# Patient Record
Sex: FEMALE | Race: WHITE | Employment: UNEMPLOYED | URBAN - METROPOLITAN AREA
[De-identification: names, ages, dates, MRNs, and addresses within clinical notes are randomized per-mention and may not be internally consistent; named-entity substitution may affect disease eponyms.]

---

## 2018-03-02 ENCOUNTER — OFFICE VISIT (OUTPATIENT)
Dept: INTERNAL MEDICINE CLINIC | Age: 28
End: 2018-03-02

## 2018-03-02 VITALS
TEMPERATURE: 98.2 F | HEIGHT: 62 IN | BODY MASS INDEX: 36.99 KG/M2 | OXYGEN SATURATION: 99 % | WEIGHT: 201 LBS | SYSTOLIC BLOOD PRESSURE: 90 MMHG | RESPIRATION RATE: 18 BRPM | DIASTOLIC BLOOD PRESSURE: 54 MMHG | HEART RATE: 51 BPM

## 2018-03-02 DIAGNOSIS — N92.6 IRREGULAR BLEEDING: ICD-10-CM

## 2018-03-02 DIAGNOSIS — R63.5 WEIGHT GAIN: ICD-10-CM

## 2018-03-02 DIAGNOSIS — Z00.00 ROUTINE GENERAL MEDICAL EXAMINATION AT A HEALTH CARE FACILITY: Primary | ICD-10-CM

## 2018-03-02 DIAGNOSIS — Z83.3 FAMILY HISTORY OF DIABETES MELLITUS: ICD-10-CM

## 2018-03-02 DIAGNOSIS — Z86.2 H/O: IRON DEFICIENCY ANEMIA: ICD-10-CM

## 2018-03-02 DIAGNOSIS — N92.6 MISSED MENSES: ICD-10-CM

## 2018-03-02 PROBLEM — T78.3XXA IDIOPATHIC ANGIOEDEMA: Status: ACTIVE | Noted: 2018-03-02

## 2018-03-02 PROBLEM — L50.1 CHRONIC IDIOPATHIC URTICARIA: Status: ACTIVE | Noted: 2018-03-02

## 2018-03-02 LAB
BILIRUB UR QL STRIP: NEGATIVE
GLUCOSE UR-MCNC: NEGATIVE MG/DL
KETONES P FAST UR STRIP-MCNC: NEGATIVE MG/DL
PH UR STRIP: 6 [PH] (ref 4.6–8)
PROT UR QL STRIP: NEGATIVE
SP GR UR STRIP: 1.01 (ref 1–1.03)
UA UROBILINOGEN AMB POC: NORMAL (ref 0.2–1)
URINALYSIS CLARITY POC: CLEAR
URINALYSIS COLOR POC: YELLOW
URINE BLOOD POC: NEGATIVE
URINE LEUKOCYTES POC: NEGATIVE
URINE NITRITES POC: NEGATIVE

## 2018-03-02 NOTE — PATIENT INSTRUCTIONS

## 2018-03-02 NOTE — PROGRESS NOTES
HISTORY OF PRESENT ILLNESS  Dk Rudolph is a 28 y.o. female. [facility] Jeimy is here for a routine physical exam.   She has h/o chronic idiopathic uritcaria and angioedema. She had been treated with xolair, but has not gone for an injection in a few months. She has an upcoming appt with the allergist. She has had minimal hives without the xolair. She has had a weight gain of about 40 lbs since her last child. She has a 11year old and 1year old. She works as an  at [employer]. She is c/o irregular cycles for the past few months. She states one cycle came early, and this month she is 9 days late. She uses condoms for protection. Her ob no longer takes her insurance. She denies shortness of breath, chest pain or leg swelling. Review of Systems   Constitutional: Negative. HENT: Negative. Eyes: Negative. Wears glasses   Respiratory: Negative. Cardiovascular: Negative. Gastrointestinal: Negative. Genitourinary: Negative. Musculoskeletal: Negative. Neurological: Negative. Psychiatric/Behavioral: Negative. Physical Exam   Constitutional: She is oriented to person, place, and time. She appears well-developed and well-nourished. No distress. HENT:   Head: Normocephalic and atraumatic. Right Ear: Tympanic membrane normal.   Left Ear: Tympanic membrane normal.   Mouth/Throat: No oropharyngeal exudate or posterior oropharyngeal erythema. Eyes: Conjunctivae are normal.   Cardiovascular: Intact distal pulses. An irregular rhythm present. Bradycardia present. No murmur heard. She states she has been told that her BP is low. She can't recall being told that her pulse is low. She denies any sx of dizziness, syncope, palpitations or shortness of breath. Pulmonary/Chest: Effort normal and breath sounds normal. She has no wheezes. Musculoskeletal: She exhibits no edema. Neurological: She is alert and oriented to person, place, and time. Skin: No rash noted. Psychiatric: She has a normal mood and affect. Her behavior is normal. Judgment and thought content normal.     Visit Vitals    BP 90/54 (BP 1 Location: Left arm, BP Patient Position: Sitting)    Pulse (!) 51    Temp 98.2 °F (36.8 °C) (Oral)    Resp 18    Ht 5' 2\" (1.575 m)    Wt 201 lb (91.2 kg)    SpO2 99%    BMI 36.76 kg/m2     Results for orders placed or performed in visit on 03/02/18   AMB POC URINALYSIS DIP STICK AUTO W/O MICRO   Result Value Ref Range    Color (UA POC) Yellow     Clarity (UA POC) Clear     Glucose (UA POC) Negative Negative    Bilirubin (UA POC) Negative Negative    Ketones (UA POC) Negative Negative    Specific gravity (UA POC) 1.010 1.001 - 1.035    Blood (UA POC) Negative Negative    pH (UA POC) 6.0 4.6 - 8.0    Protein (UA POC) Negative Negative    Urobilinogen (UA POC) 0.2 mg/dL 0.2 - 1    Nitrites (UA POC) Negative Negative    Leukocyte esterase (UA POC) Negative Negative     Advised her I will refer her to gyn for her irregular cycles. ASSESSMENT and PLAN    ICD-10-CM ICD-9-CM    1. Routine general medical examination at a health care facility Z00.00 V70.0 COLLECTION VENOUS BLOOD,VENIPUNCTURE      METABOLIC PANEL, COMPREHENSIVE      LIPID PANEL      CBC WITH AUTOMATED DIFF      TSH 3RD GENERATION      AMB POC EKG ROUTINE W/ 12 LEADS, INTER & REP      AMB POC URINALYSIS DIP STICK AUTO W/O MICRO   2. Missed menses N92.6 626.4 BETA HCG, QT   3. Irregular bleeding N92.6 626.4 REFERRAL TO OBSTETRICS AND GYNECOLOGY   4. Weight gain R63.5 783.1 HEMOGLOBIN A1C WITH EAG   5. Family history of diabetes mellitus Z83.3 V18.0 HEMOGLOBIN A1C WITH EAG   6. H/O: iron deficiency anemia Z86.2 V12.3 IRON PROFILE     Pt verbalized understanding of their condition and diagnoses, treatment plan,  as well as side effects of any new medications prescribed.

## 2018-03-02 NOTE — MR AVS SNAPSHOT
[address] [facility] [address] [place] [address] 
[phone] Patient: Marie Ames MRN: [id number] :1990 Visit Information Date & Time Provider Department Dept. Phone Encounter #  
 3/2/2018  9:15 AM Krishan Mahan PA-C [facility] [phone] [id number] Follow-up Instructions Return in about 1 year (around 3/2/2019). Upcoming Health Maintenance Date Due  
 PAP AKA CERVICAL CYTOLOGY 2019 DTaP/Tdap/Td series (2 - Td) 2024 Allergies as of 3/2/2018  Review Complete On: 3/2/2018 By: Krishan Mahan PA-C No Known Allergies Current Immunizations  Reviewed on 2014 Name Date Influenza Vaccine PF 2014  5:49 PM  
 Tdap 2014 11:28 AM  
  
 Not reviewed this visit You Were Diagnosed With   
  
 Codes Comments Routine general medical examination at a health care facility    -  Primary ICD-10-CM: Z00.00 ICD-9-CM: V70.0 Missed menses     ICD-10-CM: N92.6 ICD-9-CM: 626.4 Irregular bleeding     ICD-10-CM: N92.6 ICD-9-CM: 626.4 Weight gain     ICD-10-CM: R63.5 ICD-9-CM: 783.1 Family history of diabetes mellitus     ICD-10-CM: Z83.3 ICD-9-CM: V18.0 Vitals BP Pulse Temp Resp Height(growth percentile) Weight(growth percentile) 90/54 (BP 1 Location: Left arm, BP Patient Position: Sitting) (!) 51 98.2 °F (36.8 °C) (Oral) 18 5' 2\" (1.575 m) 201 lb (91.2 kg) LMP SpO2 BMI OB Status Smoking Status 2018 99% 36.76 kg/m2 Having regular periods Never Smoker Vitals History BMI and BSA Data Body Mass Index Body Surface Area  
 36.76 kg/m 2 2 m 2 Your Updated Medication List  
  
Notice  As of 3/2/2018 10:11 AM  
 You have not been prescribed any medications. We Performed the Following AMB POC EKG ROUTINE W/ 12 LEADS, INTER & REP [46496 CPT(R)] AMB POC URINALYSIS DIP STICK AUTO W/O MICRO [42469 CPT(R)] COLLECTION VENOUS BLOOD,VENIPUNCTURE Y038145 CPT(R)] REFERRAL TO OBSTETRICS AND GYNECOLOGY [REF51 Custom] Follow-up Instructions Return in about 1 year (around 3/2/2019). Referral Information Referral ID Referred By Referred To  
  
 [id number] Tang Singleton Justine Fuentes, [facility] [address] [facility] [address] Phone: [phone] Fax: [phone] Visits Status Start Date End Date 1 New Request 3/2/18 3/2/19 If your referral has a status of pending review or denied, additional information will be sent to support the outcome of this decision. Introducing [facility] SERVICES! Beatriz Villegas introduces [employer] patient portal. Now you can access parts of your medical record, email your doctor's office, and request medication refills online. 1. In your internet browser, go to [email or web address] 2. Click on the First Time User? Click Here link in the Sign In box. You will see the New Member Sign Up page. 3. Enter your [employer] Access Code exactly as it appears below. You will not need to use this code after youve completed the sign-up process. If you do not sign up before the expiration date, you must request a new code. · [employer] Access Code: [email or web address] Expires: 5/31/2018 10:11 AM 
 
4. Enter the last four digits of your Social Security Number (xxxx) and Date of Birth (mm/dd/yyyy) as indicated and click Submit. You will be taken to the next sign-up page. 5. Create a [employer] ID. This will be your [employer] login ID and cannot be changed, so think of one that is secure and easy to remember. 6. Create a [employer] password. You can change your password at any time. 7. Enter your Password Reset Question and Answer. This can be used at a later time if you forget your password. 8. Enter your e-mail address. You will receive e-mail notification when new information is available in [address] 9. Click Sign Up. You can now view and download portions of your medical record. 10. Click the Download Summary menu link to download a portable copy of your medical information. If you have questions, please visit the Frequently Asked Questions section of the [employer] website. Remember, [employer] is NOT to be used for urgent needs. For medical emergencies, dial 911. Now available from your iPhone and Android! Please provide this summary of care documentation to your next provider. Your primary care clinician is listed as NONE. If you have any questions after today's visit, please call [phone].

## 2018-03-02 NOTE — PROGRESS NOTES
Chief Complaint   Patient presents with    Complete Physical     1. Have you been to the ER, urgent care clinic since your last visit? Hospitalized since your last visit? No    2. Have you seen or consulted any other health care providers outside of the [address] [place] [address] since your last visit? Include any pap smears or colon screening.  No

## 2018-03-03 LAB
ALBUMIN SERPL-MCNC: 4.5 G/DL (ref 3.5–5.5)
ALBUMIN/GLOB SERPL: 1.7 {RATIO} (ref 1.2–2.2)
ALP SERPL-CCNC: 48 IU/L (ref 39–117)
ALT SERPL-CCNC: 34 IU/L (ref 0–32)
AST SERPL-CCNC: 21 IU/L (ref 0–40)
BASOPHILS # BLD AUTO: 0 X10E3/UL (ref 0–0.2)
BASOPHILS NFR BLD AUTO: 0 %
BILIRUB SERPL-MCNC: 0.5 MG/DL (ref 0–1.2)
BUN SERPL-MCNC: 8 MG/DL (ref 6–20)
BUN/CREAT SERPL: 10 (ref 9–23)
CALCIUM SERPL-MCNC: 9.3 MG/DL (ref 8.7–10.2)
CHLORIDE SERPL-SCNC: 100 MMOL/L (ref 96–106)
CHOLEST SERPL-MCNC: 172 MG/DL (ref 100–199)
CO2 SERPL-SCNC: 25 MMOL/L (ref 18–29)
CREAT SERPL-MCNC: 0.79 MG/DL (ref 0.57–1)
EOSINOPHIL # BLD AUTO: 0.1 X10E3/UL (ref 0–0.4)
EOSINOPHIL NFR BLD AUTO: 2 %
ERYTHROCYTE [DISTWIDTH] IN BLOOD BY AUTOMATED COUNT: 14 % (ref 12.3–15.4)
EST. AVERAGE GLUCOSE BLD GHB EST-MCNC: 108 MG/DL
GFR SERPLBLD CREATININE-BSD FMLA CKD-EPI: 102 ML/MIN/1.73
GFR SERPLBLD CREATININE-BSD FMLA CKD-EPI: 118 ML/MIN/1.73
GLOBULIN SER CALC-MCNC: 2.7 G/DL (ref 1.5–4.5)
GLUCOSE SERPL-MCNC: 85 MG/DL (ref 65–99)
HBA1C MFR BLD: 5.4 % (ref 4.8–5.6)
HCG INTACT+B SERPL-ACNC: <1 MIU/ML
HCT VFR BLD AUTO: 39.3 % (ref 34–46.6)
HDLC SERPL-MCNC: 51 MG/DL
HGB BLD-MCNC: 13 G/DL (ref 11.1–15.9)
IMM GRANULOCYTES # BLD: 0 X10E3/UL (ref 0–0.1)
IMM GRANULOCYTES NFR BLD: 0 %
LDLC SERPL CALC-MCNC: 101 MG/DL (ref 0–99)
LYMPHOCYTES # BLD AUTO: 2.5 X10E3/UL (ref 0.7–3.1)
LYMPHOCYTES NFR BLD AUTO: 41 %
MCH RBC QN AUTO: 30.8 PG (ref 26.6–33)
MCHC RBC AUTO-ENTMCNC: 33.1 G/DL (ref 31.5–35.7)
MCV RBC AUTO: 93 FL (ref 79–97)
MONOCYTES # BLD AUTO: 0.3 X10E3/UL (ref 0.1–0.9)
MONOCYTES NFR BLD AUTO: 5 %
NEUTROPHILS # BLD AUTO: 3.1 X10E3/UL (ref 1.4–7)
NEUTROPHILS NFR BLD AUTO: 52 %
PLATELET # BLD AUTO: 380 X10E3/UL (ref 150–379)
POTASSIUM SERPL-SCNC: 4.7 MMOL/L (ref 3.5–5.2)
PROT SERPL-MCNC: 7.2 G/DL (ref 6–8.5)
RBC # BLD AUTO: 4.22 X10E6/UL (ref 3.77–5.28)
SODIUM SERPL-SCNC: 140 MMOL/L (ref 134–144)
TRIGL SERPL-MCNC: 101 MG/DL (ref 0–149)
TSH SERPL DL<=0.005 MIU/L-ACNC: 1.59 UIU/ML (ref 0.45–4.5)
VLDLC SERPL CALC-MCNC: 20 MG/DL (ref 5–40)
WBC # BLD AUTO: 6.1 X10E3/UL (ref 3.4–10.8)

## 2019-03-07 ENCOUNTER — OFFICE VISIT (OUTPATIENT)
Dept: INTERNAL MEDICINE CLINIC | Age: 29
End: 2019-03-07

## 2019-03-07 VITALS
BODY MASS INDEX: 36.62 KG/M2 | OXYGEN SATURATION: 100 % | SYSTOLIC BLOOD PRESSURE: 114 MMHG | HEART RATE: 79 BPM | HEIGHT: 62 IN | WEIGHT: 199 LBS | DIASTOLIC BLOOD PRESSURE: 80 MMHG | RESPIRATION RATE: 18 BRPM | TEMPERATURE: 98.4 F

## 2019-03-07 DIAGNOSIS — J02.9 SORE THROAT: Primary | ICD-10-CM

## 2019-03-07 DIAGNOSIS — M54.2 NECK PAIN: ICD-10-CM

## 2019-03-07 PROBLEM — E66.01 SEVERE OBESITY (HCC): Status: ACTIVE | Noted: 2019-03-07

## 2019-03-07 LAB
S PYO AG THROAT QL: NEGATIVE
VALID INTERNAL CONTROL?: YES

## 2019-03-07 RX ORDER — OMALIZUMAB 202.5 MG/1.4ML
INJECTION, SOLUTION SUBCUTANEOUS
COMMUNITY
Start: 2018-12-04

## 2019-03-07 RX ORDER — BUPROPION HYDROCHLORIDE 150 MG/1
TABLET ORAL
COMMUNITY
Start: 2019-02-19

## 2019-03-07 NOTE — PROGRESS NOTES
HISTORY OF PRESENT ILLNESS  Annie Martel is a 33 y.o. female. HPI Ms. Leone Leyden is here for several days of a sore throat. She feels that the throat pain is radiating to both ears. She is still under the care of allergy for her idiopathic urticaria and angioedema. She denies any throat swelling or difficulty swallowing. She actually states the pain improves as the day progresses. She has taken sudafed which she feels helped her sx. She denies GERD. She does have some congestion and post nasal drainage due to allergies. Review of Systems   Constitutional: Negative for chills, fever and malaise/fatigue. HENT: Positive for congestion (post nasal drainage), ear pain and sore throat. Eyes: Negative. Respiratory: Negative for cough, shortness of breath and wheezing. Cardiovascular: Negative. Gastrointestinal: Positive for nausea (attributes to starting wellbutrin 2 weeks ago, prescribed by obgyn). Musculoskeletal: Positive for neck pain. Neurological: Negative for headaches. Psychiatric/Behavioral: Positive for depression (associated with her cycle, pms phase). Physical Exam   Constitutional: She appears well-developed and well-nourished. No distress. HENT:   Head: Normocephalic and atraumatic. Mouth/Throat: No posterior oropharyngeal edema or posterior oropharyngeal erythema (no significant erythema). Neck: Normal range of motion. Neck supple. Cardiovascular: Normal rate and regular rhythm. No murmur heard. Pulmonary/Chest: Effort normal and breath sounds normal. She has no wheezes. Lymphadenopathy:     She has no cervical adenopathy.      Visit Vitals  /80 (BP 1 Location: Left arm, BP Patient Position: Sitting)   Pulse 79   Temp 98.4 °F (36.9 °C) (Oral)   Resp 18   Ht 5' 2\" (1.575 m)   Wt 199 lb (90.3 kg)   SpO2 100%   BMI 36.40 kg/m²     Wt Readings from Last 3 Encounters:   03/07/19 199 lb (90.3 kg)   03/02/18 201 lb (91.2 kg)   09/20/14 185 lb 12.8 oz (84.3 kg) Results for orders placed or performed in visit on 03/07/19   AMB POC RAPID STREP A   Result Value Ref Range    VALID INTERNAL CONTROL POC Yes     Group A Strep Ag Negative Negative       ASSESSMENT and PLAN    ICD-10-CM ICD-9-CM    1. Sore throat J02.9 462 AMB POC RAPID STREP A  Advised salt water gargles, allegra or claritin to help reduce rhinorrhea and post nasal drainage. Can continue sudafed if needed. 2. Neck pain - likely musculoskeletal M54.2 723.1 Advised neck exercises, OTC NSAID or tylenol. Pt verbalized understanding of their condition and diagnoses, treatment plan,  as well as side effects of any new medications prescribed.

## 2019-03-07 NOTE — PROGRESS NOTES
Chief Complaint   Patient presents with    Sore Throat    Neck Pain     1. Have you been to the ER, urgent care clinic since your last visit? Hospitalized since your last visit? No    2. Have you seen or consulted any other health care providers outside of the [address] [place] [address] since your last visit? Include any pap smears or colon screening.  No

## 2019-03-08 NOTE — PATIENT INSTRUCTIONS
Neck Pain: Care Instructions  Your Care Instructions    You can have neck pain anywhere from the bottom of your head to the top of your shoulders. It can spread to the upper back or arms. Injuries, painting a ceiling, sleeping with your neck twisted, staying in one position for too long, and many other activities can cause neck pain. Most neck pain gets better with home care. Your doctor may recommend medicine to relieve pain or relax your muscles. He or she may suggest exercise and physical therapy to increase flexibility and relieve stress. You may need to wear a special (cervical) collar to support your neck for a day or two. Follow-up care is a key part of your treatment and safety. Be sure to make and go to all appointments, and call your doctor if you are having problems. It's also a good idea to know your test results and keep a list of the medicines you take. How can you care for yourself at home? · Try using a heating pad on a low or medium setting for 15 to 20 minutes every 2 or 3 hours. Try a warm shower in place of one session with the heating pad. · You can also try an ice pack for 10 to 15 minutes every 2 to 3 hours. Put a thin cloth between the ice and your skin. · Take pain medicines exactly as directed. ? If the doctor gave you a prescription medicine for pain, take it as prescribed. ? If you are not taking a prescription pain medicine, ask your doctor if you can take an over-the-counter medicine. · If your doctor recommends a cervical collar, wear it exactly as directed. When should you call for help? Call your doctor now or seek immediate medical care if:    · You have new or worsening numbness in your arms, buttocks or legs.     · You have new or worsening weakness in your arms or legs.  (This could make it hard to stand up.)     · You lose control of your bladder or bowels.    Watch closely for changes in your health, and be sure to contact your doctor if:    · Your neck pain is getting worse.     · You are not getting better after 1 week.     · You do not get better as expected. Where can you learn more? Go to http://myron-jeff.info/. Enter [id number] in the search box to learn more about \"Neck Pain: Care Instructions. \"  Current as of: September 20, 2018  Content Version: 11.9  © 7906-6959 [employer]. Care instructions adapted under license by [employer] (which disclaims liability or warranty for this information). If you have questions about a medical condition or this instruction, always ask your healthcare professional. [place] [address] any warranty or liability for your use of this information.

## 2019-07-05 ENCOUNTER — HOSPITAL ENCOUNTER (OUTPATIENT)
Dept: LAB | Age: 29
Discharge: HOME | End: 2019-07-05
Payer: COMMERCIAL

## 2019-07-05 ENCOUNTER — OFFICE VISIT (OUTPATIENT)
Dept: INTERNAL MEDICINE CLINIC | Age: 29
End: 2019-07-05

## 2019-07-05 VITALS
DIASTOLIC BLOOD PRESSURE: 80 MMHG | OXYGEN SATURATION: 98 % | TEMPERATURE: 97.5 F | HEIGHT: 62 IN | RESPIRATION RATE: 16 BRPM | SYSTOLIC BLOOD PRESSURE: 112 MMHG | HEART RATE: 54 BPM | BODY MASS INDEX: 36.25 KG/M2 | WEIGHT: 197 LBS

## 2019-07-05 DIAGNOSIS — Z00.00 ROUTINE PHYSICAL EXAMINATION: Primary | ICD-10-CM

## 2019-07-05 DIAGNOSIS — E66.9 OBESITY (BMI 35.0-39.9 WITHOUT COMORBIDITY): ICD-10-CM

## 2019-07-05 DIAGNOSIS — Z00.00 ROUTINE PHYSICAL EXAMINATION: ICD-10-CM

## 2019-07-05 DIAGNOSIS — F32.A DEPRESSION, UNSPECIFIED DEPRESSION TYPE: ICD-10-CM

## 2019-07-05 LAB
ALBUMIN SERPL-MCNC: 4 G/DL (ref 3.4–5)
ALBUMIN/GLOB SERPL: 1.2 {RATIO} (ref 0.8–1.7)
ALP SERPL-CCNC: 54 U/L (ref 45–117)
ALT SERPL-CCNC: 28 U/L (ref 13–56)
ANION GAP SERPL CALC-SCNC: 7 MMOL/L (ref 3–18)
AST SERPL-CCNC: 13 U/L (ref 15–37)
BASOPHILS # BLD: 0 K/UL (ref 0–0.1)
BASOPHILS NFR BLD: 0 % (ref 0–2)
BILIRUB SERPL-MCNC: 0.4 MG/DL (ref 0.2–1)
BUN SERPL-MCNC: 8 MG/DL (ref 7–18)
BUN/CREAT SERPL: 8 (ref 12–20)
CALCIUM SERPL-MCNC: 8.8 MG/DL (ref 8.5–10.1)
CHLORIDE SERPL-SCNC: 104 MMOL/L (ref 100–108)
CHOLEST SERPL-MCNC: 199 MG/DL
CO2 SERPL-SCNC: 28 MMOL/L (ref 21–32)
CREAT SERPL-MCNC: 0.98 MG/DL (ref 0.6–1.3)
DIFFERENTIAL METHOD BLD: NORMAL
EOSINOPHIL # BLD: 0.2 K/UL (ref 0–0.4)
EOSINOPHIL NFR BLD: 2 % (ref 0–5)
ERYTHROCYTE [DISTWIDTH] IN BLOOD BY AUTOMATED COUNT: 13.3 % (ref 11.6–14.5)
GLOBULIN SER CALC-MCNC: 3.3 G/DL (ref 2–4)
GLUCOSE SERPL-MCNC: 84 MG/DL (ref 74–99)
HBA1C MFR BLD: 5.4 % (ref 4.2–5.6)
HCT VFR BLD AUTO: 40.4 % (ref 35–45)
HDLC SERPL-MCNC: 59 MG/DL (ref 40–60)
HDLC SERPL: 3.4 {RATIO} (ref 0–5)
HGB BLD-MCNC: 12.9 G/DL (ref 12–16)
LDLC SERPL CALC-MCNC: 117 MG/DL (ref 0–100)
LIPID PROFILE,FLP: ABNORMAL
LYMPHOCYTES # BLD: 2.2 K/UL (ref 0.9–3.6)
LYMPHOCYTES NFR BLD: 31 % (ref 21–52)
MCH RBC QN AUTO: 30.1 PG (ref 24–34)
MCHC RBC AUTO-ENTMCNC: 31.9 G/DL (ref 31–37)
MCV RBC AUTO: 94.4 FL (ref 74–97)
MONOCYTES # BLD: 0.4 K/UL (ref 0.05–1.2)
MONOCYTES NFR BLD: 6 % (ref 3–10)
NEUTS SEG # BLD: 4.2 K/UL (ref 1.8–8)
NEUTS SEG NFR BLD: 61 % (ref 40–73)
PLATELET # BLD AUTO: 352 K/UL (ref 135–420)
PMV BLD AUTO: 9.5 FL (ref 9.2–11.8)
POTASSIUM SERPL-SCNC: 3.8 MMOL/L (ref 3.5–5.5)
PROT SERPL-MCNC: 7.3 G/DL (ref 6.4–8.2)
RBC # BLD AUTO: 4.28 M/UL (ref 4.2–5.3)
SODIUM SERPL-SCNC: 139 MMOL/L (ref 136–145)
TRIGL SERPL-MCNC: 115 MG/DL (ref ?–150)
TSH SERPL DL<=0.05 MIU/L-ACNC: 1.06 UIU/ML (ref 0.36–3.74)
VLDLC SERPL CALC-MCNC: 23 MG/DL
WBC # BLD AUTO: 7 K/UL (ref 4.6–13.2)

## 2019-07-05 PROCEDURE — 36415 COLL VENOUS BLD VENIPUNCTURE: CPT

## 2019-07-05 PROCEDURE — 80053 COMPREHEN METABOLIC PANEL: CPT

## 2019-07-05 PROCEDURE — 85025 COMPLETE CBC W/AUTO DIFF WBC: CPT

## 2019-07-05 PROCEDURE — 83036 HEMOGLOBIN GLYCOSYLATED A1C: CPT

## 2019-07-05 PROCEDURE — 80061 LIPID PANEL: CPT

## 2019-07-05 PROCEDURE — 84443 ASSAY THYROID STIM HORMONE: CPT

## 2019-07-05 NOTE — PROGRESS NOTES
Chief Complaint   Patient presents with    Physical     Fasting for labwork. 1. Have you been to the ER, urgent care clinic since your last visit? Hospitalized since your last visit? No    2. Have you seen or consulted any other health care providers outside of the [address] [place] [address] since your last visit? Include any pap smears or colon screening. Yes Where:  [facility]  3 most recent PHQ Screens 7/5/2019   PHQ Not Done Active Diagnosis of Depression or Bipolar Disorder   Little interest or pleasure in doing things Not at all   Feeling down, depressed, irritable, or hopeless Nearly every day   Total Score PHQ 2 3   Trouble falling or staying asleep, or sleeping too much Not at all   Feeling tired or having little energy Several days   Poor appetite, weight loss, or overeating Several days   Feeling bad about yourself - or that you are a failure or have let yourself or your family down Not at all   Trouble concentrating on things such as school, work, reading, or watching TV Several days   Moving or speaking so slowly that other people could have noticed; or the opposite being so fidgety that others notice Not at all   Thoughts of being better off dead, or hurting yourself in some way Not at all   PHQ 9 Score 6   How difficult have these problems made it for you to do your work, take care of your home and get along with others Not difficult at all

## 2019-07-05 NOTE — PROGRESS NOTES
HISTORY OF PRESENT ILLNESS  Alon Villa is a 33 y.o. female. HPI Ms. Teo Estrada is here for a general physical exam.   3 most recent PHQ Screens 7/5/2019   PHQ Not Done Active Diagnosis of Depression or Bipolar Disorder   Little interest or pleasure in doing things Not at all   Feeling down, depressed, irritable, or hopeless Nearly every day   Total Score PHQ 2 3   Trouble falling or staying asleep, or sleeping too much Not at all   Feeling tired or having little energy Several days   Poor appetite, weight loss, or overeating Several days   Feeling bad about yourself - or that you are a failure or have let yourself or your family down Not at all   Trouble concentrating on things such as school, work, reading, or watching TV Several days   Moving or speaking so slowly that other people could have noticed; or the opposite being so fidgety that others notice Not at all   Thoughts of being better off dead, or hurting yourself in some way Not at all   PHQ 9 Score 6   How difficult have these problems made it for you to do your work, take care of your home and get along with others Not difficult at all     She is on Wellbutrin XL 300mg from her gynecologist. She had stopped wellbutrin for about 3 weeks b/c she had forgotten about it in her suitcase after a work trip. She has been talking to a therapist on [facility] for the past 3 months. Suggested she may benefit from seeing a psychiatrist. She relates depression to her job as an  for [email or web address]. She states it is rather boring and the people she works with are sexist and racist.   She has seen gynecology for her annual check up in March. Review of Systems   Constitutional: Negative. HENT: Negative. Eyes: Negative for blurred vision. Respiratory: Negative. Cardiovascular: Negative. Gastrointestinal: Positive for nausea (associated with her menstrual cycles). Genitourinary: Negative. Musculoskeletal: Negative.     Neurological: Positive for headaches (mostly associated with her cycles. 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 Had glasses changed last year, but felt the prescription isn't right and went back to her old glasses). Psychiatric/Behavioral: Positive for depression. Negative for memory loss and suicidal ideas. The patient is not nervous/anxious. Physical Exam   Constitutional: She appears well-developed and well-nourished. No distress. Cardiovascular: Normal rate and regular rhythm. Visit Vitals  /80 (BP 1 Location: Left arm, BP Patient Position: Sitting)   Pulse (!) 54   Temp 97.5 °F (36.4 °C) (Oral)   Resp 16   Ht 5' 2\" (1.575 m)   Wt 197 lb (89.4 kg)   SpO2 98%   BMI 36.03 kg/m²     Wt Readings from Last 3 Encounters:   07/05/19 197 lb (89.4 kg)   03/07/19 199 lb (90.3 kg)   03/02/18 201 lb (91.2 kg)     Recommend increase exercise, diet and weight reduction      ASSESSMENT and PLAN    ICD-10-CM ICD-9-CM    1. Routine physical examination Z00.00 V70.0 TSH 3RD GENERATION      CBC WITH AUTOMATED DIFF      METABOLIC PANEL, COMPREHENSIVE      LIPID PANEL      HEMOGLOBIN A1C W/O EAG   2. Obesity (BMI 35.0-39.9 without comorbidity) E66.9 278.00 TSH 3RD GENERATION      CBC WITH AUTOMATED DIFF      METABOLIC PANEL, COMPREHENSIVE      LIPID PANEL      HEMOGLOBIN A1C W/O EAG   3. Depression, unspecified depression type F32.9 311 Continue wellbutrin. Encouraged to consider seeing psychiatry     Pt verbalized understanding of their condition and diagnoses, treatment plan,  as well as side effects of any new medications prescribed.

## 2019-07-09 PROBLEM — F32.A DEPRESSION: Status: ACTIVE | Noted: 2019-07-09

## 2019-07-09 NOTE — PATIENT INSTRUCTIONS
